# Patient Record
Sex: FEMALE | URBAN - METROPOLITAN AREA
[De-identification: names, ages, dates, MRNs, and addresses within clinical notes are randomized per-mention and may not be internally consistent; named-entity substitution may affect disease eponyms.]

---

## 2018-07-30 ENCOUNTER — APPOINTMENT (RX ONLY)
Dept: URBAN - METROPOLITAN AREA CLINIC 15 | Facility: CLINIC | Age: 33
Setting detail: DERMATOLOGY
End: 2018-07-30

## 2018-07-30 DIAGNOSIS — Z41.9 ENCOUNTER FOR PROCEDURE FOR PURPOSES OTHER THAN REMEDYING HEALTH STATE, UNSPECIFIED: ICD-10-CM

## 2018-07-30 PROCEDURE — ? COOLSCULPTING

## 2018-07-30 NOTE — PROCEDURE: COOLSCULPTING
Suction Settings: The suction settings were per protocol.
Intro: Prior to treatment, the area was cleaned with alcohol and marked out with a marking pen. The gel sheet was then applied uniformly. The applicator was applied to the skin with good contact and suction.
Time (Minutes - Will Only Render If Nonzero): [address] [facility] [address]
Applicator Size: CoolAdvantage Fit
Time (Minutes - Will Only Render If Nonzero): [address]
Applicator Size: CoolMini applicator
Applicator Size: CoolAdvantage Curve
Time (Minutes - Will Only Render If Nonzero): 35
Time (Minutes - Will Only Render If Nonzero): 45
Device: Coolsculpting
Detail Level: Zone
Treatment Administered By (Optional): [address]
Applicator Size: CoolCurve Plus applicator
Location 1: lower abdomen
Consent: Written consent obtained, risks reviewed including, but not limited to, blistering from suction, darker or lighter pigmentary change, bruising, and/or need for multiple treatments.
Post Treatment: After treatment, the suction was turned off, and the applicator was removed from the skin.
Patient Weight (Optional): 135 lb

## 2018-10-08 ENCOUNTER — APPOINTMENT (RX ONLY)
Dept: URBAN - METROPOLITAN AREA CLINIC 15 | Facility: CLINIC | Age: 33
Setting detail: DERMATOLOGY
End: 2018-10-08

## 2018-10-08 DIAGNOSIS — Z41.9 ENCOUNTER FOR PROCEDURE FOR PURPOSES OTHER THAN REMEDYING HEALTH STATE, UNSPECIFIED: ICD-10-CM

## 2018-10-08 PROCEDURE — ? COSMETIC FOLLOW-UP

## 2018-10-08 NOTE — PROCEDURE: COSMETIC FOLLOW-UP
Treatment Override (Free Text): CoolSculpting
Comments (Free Text): Patient was here for CoolSculpting follow up of xx months. \\nCoolSculpting was performed on xx/xx/xx on xxx area with xxx applicators. \\n\\n(S) Medication compliance, denies use of anti- inflammatory agents. \\nPatient has noticed a reduction in the treatment area. \\n\\n(O) Weight: xxx lb. (previous xxx lb. )\\nNo tenderness, hyperpigmentation, or skin lesions. Tissue is soft to palpation, and there are no granulations or nodules. \\nPictures were taken and compared with baseline showing good results, noticed reduction of at least 20% of the treatment area. \\n\\Darnell discussed before we planned a 2nd treatment for this area.
Detail Level: Zone